# Patient Record
Sex: MALE | Race: WHITE | Employment: OTHER | URBAN - NONMETROPOLITAN AREA
[De-identification: names, ages, dates, MRNs, and addresses within clinical notes are randomized per-mention and may not be internally consistent; named-entity substitution may affect disease eponyms.]

---

## 2021-01-08 ENCOUNTER — HOSPITAL ENCOUNTER (OUTPATIENT)
Dept: PHYSICAL THERAPY | Age: 71
Discharge: HOME | End: 2021-01-08
Payer: MEDICARE

## 2021-01-08 PROCEDURE — 97161 PT EVAL LOW COMPLEX 20 MIN: CPT

## 2021-01-08 NOTE — PROGRESS NOTES
[address]  [place], [place]  Ph: [phone]    Fax: [phone]    Plan of Care/Statement of Necessity for Physical Therapy Services  2-15    Patient name: Lorenzo Tidwell  : 1950  Provider#: [id number]  Referral source: Rah Ashraf, *      Medical/Treatment Diagnosis: Fracture of left ankle [S82.395Y]     Prior Hospitalization: see medical history     Comorbidities: see medical history  Prior Level of Function: community ambulator  Medications: Verified on Patient Summary List  Start of Care: 2021      Onset Date: 21   The Plan of Care and following information is based on the information from the initial evaluation. Assessment/ key information: Pt is a pleasant 79 y.o. male presenting to physical therapy s/p ORIF of L ankle on 21. Pt demonstrates increased pain and swelling, decreased L ankle ROM, decreased LE strength, decreased weight bearing status, decreased gait quality and endurance, as well as decreased functional mobility. Pt would benefit from skilled physical therapy to improve pain and swelling with the use of therapeutic modalities, improve ankle ROM, LE strength, and weight bearing tolerance with therapeutic exercises, improve gait quality and endurance with gait training, and improve functional mobility.      Evaluation Complexity History MEDIUM  Complexity : 1-2 comorbidities / personal factors will impact the outcome/ POC ; Examination MEDIUM Complexity : 3 Standardized tests and measures addressing body structure, function, activity limitation and / or participation in recreation  ;Presentation LOW Complexity : Stable, uncomplicated  ;Clinical Decision Making Unable to perform TUG due to weightbearing status  Overall Complexity Rating: LOW     Problem List: pain affecting function, decrease ROM, decrease strength, edema affecting function, impaired gait/ balance, decrease ADL/ functional abilitiies, decrease activity tolerance and decrease transfer abilities   Treatment Plan may include any combination of the following: Therapeutic exercise, Therapeutic activities, Neuromuscular re-education, Physical agent/modality, Gait/balance training, Manual therapy, Patient education, Functional mobility training and Stair training  Patient / Family readiness to learn indicated by: trying to perform skills  Persons(s) to be included in education: patient (P)  Barriers to Learning/Limitations: None  Patient Goal (s): to return to PLOF  Patient Self Reported Health Status: good  Rehabilitation Potential: good    Short Term Goals: To be accomplished in 8 treatments:  1. Pt will be able to demonstrate proper use of FWW with proper weight bearing status to facilitate functional mobility with weight bearing precautions. 2. Pt will demonstrate proper donning/doffing of boot to facilitate proper protection of ORIF. 3. Pt will be able to increase DF/PF L AROM to match R to facilitate ankle mobility for improved gait quality. Long Term Goals: To be accomplished in 16 treatments:  1. Pt will be independent and compliant with HEP in order to facilitate functional mobility and carryover from PT services. 2. Pt will be able to increase DF/PF strength on L to 4/5 to facilitate performance of ADLs. 3. Pt will be able to ambulate 500' with LRAD with proper weight bearing status to facilitate household ambulation. Frequency / Duration: Patient to be seen 2 times per week for 8 weeks. Patient/ Caregiver education and instruction: activity modification and brace/ splint application    [x]  Plan of care has been reviewed with PTA        Certification Period: 1/8/21 - 3/5/21    Sarah Villalta PT, DPT 1/8/2021     ________________________________________________________________________    I certify that the above Therapy Services are being furnished while the patient is under my care.  I agree with the treatment plan and certify that this therapy is necessary.     Physician's Signature:____________________  Date:____________Time: _________    Patient name: Vicky Sprague  : 1950  Provider#: [id number]

## 2021-01-08 NOTE — PROGRESS NOTES
PT INITIAL EVALUATION NOTE - [facility] 2-15    Patient Name: Lauren Sports  Date:2021  : 1950  [x]  Patient  Verified  Payor: Priya Grimes / Plan: [place] & [place] / Product Type: Medicare /    In time:10:28  Out time:11:19  Total Treatment Time (min): 51  Total Timed Codes (min): 51  1:1 Treatment Time ( [address] only): 46   Visit #: 1    Treatment Area: Fracture of left ankle [S82.892A]    SUBJECTIVE  Pain Level (0-10 scale): 6/10; worst: 10/10 (throbbing, sharp p!)  Any medication changes, allergies to medications, adverse drug reactions, diagnosis change, or new procedure performed?: [] No    [x] Yes (see summary sheet for update)  Subjective: Pt reports that he was trying to turn around on a ladder, 4-5 steps off of the ground, when he fell of and broke his L ankle. Pt received ORIF surgery on 21. Pt reports he has been using axillary crutches that he borrowed from a family member. Pt brought in a boot that he had from a previous injury to utilize. Pt reports that the doctor told him \"not to put much weight\" on his L foot. PLOF: community ambulator; perform ADLs. Mechanism of Injury: fall off of ladder  Previous Treatment/Compliance: yes - for other medical reasons  Radiographs: x-ray   What increases symptoms: trying to stand up, weight on the foot  What decreases symptoms: pain medication  PMHx/Surgical Hx: diabetes, pacemaker  Work Hx: retired  Living Situation: one story home; 1 step to enter from side of house with no railing and 3 steps of enter from front of house B railings. Pt Goals: \"to return to PLOF\"  Barriers: poor previous PT experiences. Motivation: good  Substance use: none noted  Cognition: A & O x 4        OBJECTIVE/EXAMINATION  Other Observations: Noticeable bruising, erythema, and warmth to anterior shin proximal to surgical site as well as swelling around L foot and ankle consistent with post-operative status.   Gait and Functional Mobility: Pt ambulates into clinic with use of axillary crutches with reduced weight bearing on L. Palpation: TTP over L foot and ankle    Hip:  Strength AROM PROM     Right Left Right Left Right Left    Flexion 4/5 4/5 WFL WFL NT NT   Knee:  Strength AROM PROM     Right Left Right Left Right Left    Flexion 5/5 5/5 WFL WFL NT NT    Extension 5/5 5/5 WFL WFL NT NT   Ankle  Strength AROM PROM     Right Left Right Left Right Left    Dorsiflexion 5/5 2/5 19 WFL NT NT    Platarflexion 5/5 2/5 18 WFL NT NT   *All strength measures are on a scale with 5 as a maximum, if a space is left blank it was not tested. All ROM measurements are measured in degrees. With   [x] TE   [] TA   [] neuro   [] other: Patient Education: [] Review HEP    [] Progressed/Changed HEP based on:   [] positioning   [] body mechanics   [] transfers   [x] heat/ice application    [x] other: Pt educated on wrapping of ankle as well as donning/doffing of boot. Pt educated on proper use of FWW for maintaining proper weight bearing status. Pt recommended to perform ankle pumps to aid in reduction of post-operative swelling. Other Objective/Functional Measures: Unable to perform TUG due to weightbearing status.     Pain Level (0-10 scale) post treatment: 6/10    ASSESSMENT/Changes in Function:     [x]  See Plan of Samanta Palomo PT, DPT 1/8/2021

## 2021-01-12 ENCOUNTER — APPOINTMENT (OUTPATIENT)
Dept: PHYSICAL THERAPY | Age: 71
End: 2021-01-12
Payer: MEDICARE

## 2021-01-12 NOTE — PROGRESS NOTES
[address]  [place], [place]  Ph: [phone]     Fax: [phone]    Discharge Summary 2-15    Patient name: Marlen Vera  : 1950  Provider#: [id number]  Referral source: Yasemin [place], *      Medical/Treatment Diagnosis: Fracture of left ankle [S82.892A]     Prior Hospitalization: see medical history     Comorbidities: See Plan of Care  Prior Level of Function: See Plan of Care  Medications: Verified on Patient Summary List    Start of Care: 21      Onset Date:21   Visits from Start of Care: 1   Missed Visits: 0  Reporting Period : 21 to 2021    Assessment / Summary of care: Pt is a pleasant 79 y.o. male referred to physical therapy s/p ORIF of L ankle on 21. After discussion with MD office, pt is not appropriate at this time for physical therapy due to acute nature of surgery. PT discussed with pt the need to discontinue physical therapy at this time following discussion with MD office. RECOMMENDATIONS:  [x]Discontinue therapy: []Patient has reached or is progressing toward set goals     []Patient is non-compliant or has abdicated     []Due to lack of appreciable progress towards set goals     [x]Other: Pt is inappropriate for physical therapy at this time.     Rah Rico, PT, DPT 2021

## 2021-01-14 ENCOUNTER — APPOINTMENT (OUTPATIENT)
Dept: PHYSICAL THERAPY | Age: 71
End: 2021-01-14
Payer: MEDICARE

## 2021-01-19 ENCOUNTER — APPOINTMENT (OUTPATIENT)
Dept: PHYSICAL THERAPY | Age: 71
End: 2021-01-19
Payer: MEDICARE

## 2021-01-21 ENCOUNTER — APPOINTMENT (OUTPATIENT)
Dept: PHYSICAL THERAPY | Age: 71
End: 2021-01-21
Payer: MEDICARE

## 2022-05-26 ENCOUNTER — TRANSCRIBE ORDER (OUTPATIENT)
Dept: SCHEDULING | Age: 72
End: 2022-05-26

## 2022-05-26 DIAGNOSIS — G89.29 CHRONIC PAIN OF LEFT ANKLE: Primary | ICD-10-CM

## 2022-05-26 DIAGNOSIS — M25.572 CHRONIC PAIN OF LEFT ANKLE: Primary | ICD-10-CM

## 2022-06-01 ENCOUNTER — HOSPITAL ENCOUNTER (OUTPATIENT)
Dept: CT IMAGING | Age: 72
Discharge: HOME | End: 2022-06-01
Attending: ORTHOPAEDIC SURGERY
Payer: MEDICARE

## 2022-06-01 DIAGNOSIS — G89.29 CHRONIC PAIN OF LEFT ANKLE: ICD-10-CM

## 2022-06-01 DIAGNOSIS — M25.572 CHRONIC PAIN OF LEFT ANKLE: ICD-10-CM

## 2022-06-01 PROCEDURE — 73700 CT LOWER EXTREMITY W/O DYE: CPT

## 2023-05-09 ENCOUNTER — HOSPITAL ENCOUNTER (OUTPATIENT)
Facility: HOSPITAL | Age: 73
Setting detail: RECURRING SERIES
Discharge: HOME | End: 2023-05-12
Payer: MEDICARE

## 2023-05-09 PROCEDURE — 97161 PT EVAL LOW COMPLEX 20 MIN: CPT

## 2023-05-09 NOTE — THERAPY EVALUATION
[address]  [place], [place]  Ph: [phone]     Fax: [phone]         PHYSICAL THERAPY - MEDICARE EVALUATION/PLAN OF CARE NOTE (updated 3/23)      Date: 2023          Patient Name:  Megan Inman :  1950   Medical   Diagnosis:  Localized swelling, mass and lump, lower limb, bilateral [R22.43]  Foot drop, left foot [M21.372]  Low back pain, unspecified [M54.50] Treatment Diagnosis:  M54.41  LUMBAGO WITH SCIATICA, RIGHT SIDE    Referral Source:  Sameera De Jesus MD Provider #:  [id number]                Insurance: Payor: MEDICARE / Plan: MEDICARE PART A AND B / Product Type: *No Product type* /    [x] Patient's date of birth verified      Visit #   Current  / Total 1 12   Time   In / Out 3:25 PM 4:00 PM   Total Treatment Time 35 minutes   Total Timed Codes 0 minutes   1:1 Treatment Time 0 minutes      [facility] Totals Reminder:  bill using total billable   min of TIMED therapeutic procedures and modalities. 8-22 min = 1 unit; 23-37 min = 2 units; 38-52 min = 3 units;  53-67 min = 4 units; 68-82 min = 5 units       SUBJECTIVE  Pain Level (0-10 scale): 10/10  Changes in pain since onset: Constant    Any medication changes, allergies to medications, adverse drug reactions, diagnosis change, or new procedure performed?: [x] No    [] Yes (see summary sheet for update)  Medications: Verified on Patient Summary List    Subjective functional status/changes:     Patient is 67 y.o.  Male  who presents for physical therapy evaluation with increasing low back pain with radiculopathy of right LE. Patient is experiencing increasing lumbar pain with any movement and apprehensive regarding treatments plan. MD recommended Physical Therapy for acute LB pain and  a MRI on May 25, 2023. Additionally, patient has surgery for  left ankle which required fusion 6 months ago. Dianne Collins  He now has left drop foot and requires a cane and walking boot with

## 2023-05-16 ENCOUNTER — APPOINTMENT (OUTPATIENT)
Facility: HOSPITAL | Age: 73
End: 2023-05-16
Payer: MEDICARE

## 2023-05-17 ENCOUNTER — HOSPITAL ENCOUNTER (OUTPATIENT)
Facility: HOSPITAL | Age: 73
Setting detail: RECURRING SERIES
Discharge: HOME | End: 2023-05-20
Payer: MEDICARE

## 2023-05-17 PROCEDURE — 97110 THERAPEUTIC EXERCISES: CPT

## 2023-05-17 RX ORDER — INSULIN ASPART 100 [IU]/ML
INJECTION, SOLUTION INTRAVENOUS; SUBCUTANEOUS
COMMUNITY

## 2023-05-17 NOTE — PROGRESS NOTES
PHYSICAL THERAPY - MEDICARE DAILY TREATMENT NOTE (updated 3/23)    Date of Service: 2023        Patient Name:  Marlen Steen :  1950   Medical   Diagnosis:  Localized swelling, mass and lump, lower limb, bilateral [R22.43]  Foot drop, left foot [M21.372]  Low back pain, unspecified [M54.50] Treatment Diagnosis:  M54.41  LUMBAGO WITH SCIATICA, RIGHT SIDE    Referral Source:  Macie Parker MD Insurance:   Payor: Ravin Bachelor / Plan: MEDICARE PART A AND B / Product Type: *No Product type* /    [x] Patient's date of birth verified                      Visit #   Current  / Total 2 12   Time   In / Out 10:30 AM 10:55 AM   Total Treatment Time 25 minutes   Total Timed Codes 25 minutes   1:1 Treatment Time 25 minutes      [facility] Totals Reminder:  bill using total billable   min of TIMED therapeutic procedures and modalities. 8-22 min = 1 unit; 23-37 min = 2 units; 38-52 min = 3 units; 53-67 min = 4 units; 68-82 min = 5 units        SUBJECTIVE  Pain Level (0-10 scale): 6/10    Any medication changes, allergies to medications, adverse drug reactions, diagnosis change, or new procedure performed?: [x] No    [] Yes (see summary sheet for update)  Medications: [x] Verified on Patient Summary List    Subjective functional status/changes:     \"Patient  stated that he  be getting a heart catherization on  tomorrow, May 18, 2023, in [place]. \" I did the stress test last week and found a medium blockage. However, they have  been trying me on a new medication which is causing me to be nauseated. OBJECTIVE  Therapeutic Procedures: Tx Min Billable or 1:1 Min (if diff from Tx Min) Procedure, Rationale, Specifics   25 25 [id number] Therapeutic Exercise (timed):  increase ROM, strength, coordination, balance, and proprioception to improve patient's ability to progress to PLOF and address remaining functional goals.  (see flow sheet as applicable)                   25 25    Total Total       Pain Level at end of session

## 2023-05-19 ENCOUNTER — APPOINTMENT (OUTPATIENT)
Facility: HOSPITAL | Age: 73
End: 2023-05-19
Payer: MEDICARE

## 2023-05-22 ENCOUNTER — HOSPITAL ENCOUNTER (OUTPATIENT)
Facility: HOSPITAL | Age: 73
Setting detail: RECURRING SERIES
Discharge: HOME | End: 2023-05-25
Payer: MEDICARE

## 2023-05-22 NOTE — PROGRESS NOTES
Communication regarding medical clearance following heart catheterization performed on May 18, 2023 at [facility]. Therapist contacted Dr. Chanel Ralph office and left a telephone voice message with his nurse, Rimma Doshi, for medical clearance to continue Physical Therapy following heart procedure. Telephone number is for MD office is  [phone]. Requested a follow up return phone call. No  Physical Therapy treatment provided today. Next Physical Therapy appointment is scheduled for 5/23/23.

## 2023-05-23 ENCOUNTER — HOSPITAL ENCOUNTER (OUTPATIENT)
Facility: HOSPITAL | Age: 73
Setting detail: RECURRING SERIES
End: 2023-05-23
Payer: MEDICARE

## 2023-06-01 ENCOUNTER — HOSPITAL ENCOUNTER (OUTPATIENT)
Facility: HOSPITAL | Age: 73
Setting detail: RECURRING SERIES
Discharge: HOME | End: 2023-06-04
Payer: MEDICARE

## 2023-06-01 PROCEDURE — 97110 THERAPEUTIC EXERCISES: CPT

## 2023-06-07 ENCOUNTER — HOSPITAL ENCOUNTER (OUTPATIENT)
Facility: HOSPITAL | Age: 73
Setting detail: RECURRING SERIES
Discharge: HOME | End: 2023-06-10
Payer: MEDICARE

## 2023-06-07 PROCEDURE — 97150 GROUP THERAPEUTIC PROCEDURES: CPT

## 2023-06-07 NOTE — PROGRESS NOTES
remaining functional goals. min [] Estim Unattended,             []  NMES  [] PreMod       []  IFC  [] Other:  []w/US   []w/ice   []w/heat  Position:  Location:            min []  Mechanical Traction,        []  Cervical      []  Lumbar        []  Prone         []  Supine           []  Intermitt. []  Cont. Lbs:    pounds  [] With heat  [] Simultaneously performed with E-Stim    min []  Ultrasound,    []Continuous   [] Pulsed  []1MHz   []3MHz Location:  W/cm2:   10 min  Unbilled []  Ice     [x]  Heat             Position: seated   Location: low back            min []  Vasopneumatic Device,      pre-treatment girth :  cm   post-treatment girth : cm   measured at (landmark       location) :  Pressure:       [] lo [] med [] hi   Temperature: [] lo [] med [] Hi    [] With ice    [] Simultaneously performed with Estim     Skin assessment post-treatment (if applicable):    [x]  intact    [x]  redness- no adverse reaction []redness - adverse reaction:        [x] Patient Education billed concurrently with other procedures   [x] Review HEP    [] Progressed/Changed HEP, detail:    [] Other:         Pain Level at end of session (0-10 scale): 3/10    Assessment   Patient tolerated treatment worked on stretching bilat and with increased attention to areas that seem to address R low back increased c/o. Pt does have tightness in both R and L legs at different areas. MHP used post ex to ease lingering soreness. Patient will continue to benefit from skilled PT services to modify and progress therapeutic interventions, analyze and address functional mobility deficits, analyze and address ROM deficits, analyze and address strength deficits, and analyze and address soft tissue restrictions to address functional deficits and attain remaining goals. Progress toward goals / Updated goals:     SHORT TERM GOALS, to be met within 6 treatments:  1.  Patient will be able to perform lumbar stretching exercises independently as

## 2024-07-24 ENCOUNTER — HOSPITAL ENCOUNTER (OUTPATIENT)
Facility: HOSPITAL | Age: 74
Setting detail: RECURRING SERIES
Discharge: HOME | End: 2024-07-27
Payer: MEDICARE

## 2024-07-24 PROCEDURE — 97110 THERAPEUTIC EXERCISES: CPT

## 2024-07-24 PROCEDURE — 97161 PT EVAL LOW COMPLEX 20 MIN: CPT

## 2024-07-24 NOTE — THERAPY EVALUATION
this task  Current Status: see above, Initial Evaluation completed today  Goal Met?  No    3. Patient will be able to get out of bed with less then 3/10 pain and less difficulty with improve lumbar flexion to >80 deg.   Status at last Eval/Progress Note: 70 deg  Current Status: see above, Initial Evaluation completed today  Goal Met?  No      Frequency / Duration: Patient to be seen 2 times per week for 10 treatments    Patient/ Caregiver education and instruction: Diagnosis, prognosis, exercises   [x]  Plan of care has been reviewed with PTA    Certification period: 2024 to 10/22/2024        Abi Carr PT, DPT       2024       11:08 AM        ===================================================================  I certify that the above Therapy Services are being furnished while the patient is under my care. I agree with the treatment plan and certify that this therapy is necessary.    Physician's Signature:_________________________   DATE:_________   TIME:________                           Jeimy Swartz MD    ** Signature, Date and Time must be completed for valid certification **  Please sign and fax to [phone].  Thank you    Patient Name:  Byron Hyde :  1950       - - -

## 2024-07-29 ENCOUNTER — HOSPITAL ENCOUNTER (OUTPATIENT)
Facility: HOSPITAL | Age: 74
Setting detail: RECURRING SERIES
Discharge: HOME | End: 2024-08-01
Payer: MEDICARE

## 2024-07-29 PROCEDURE — 97110 THERAPEUTIC EXERCISES: CPT

## 2024-07-29 NOTE — PROGRESS NOTES
PHYSICAL THERAPY - MEDICARE DAILY TREATMENT NOTE (updated 3/23)    Date of Service: 2024        Patient Name:  Byron Hyde :  1950   Medical   Diagnosis:  S/P lumbar spinal fusion [Z98.1] Treatment Diagnosis:  M54.59  OTHER LOWER BACK PAIN    Referral Source:  Jeimy Swartz MD Insurance:   Payor: MEDICARE / Plan: MEDICARE PART A AND B / Product Type: *No Product type* /    [x] Patient's date of birth verified                      Visit #   Current  / Total 2 10   Time   In / Out 1100 1154   Total Treatment Time (in minutes) 54    Total Timed Codes (in minutes) 44    1:1 Treatment Time (in minutes) 44       [facility] Totals Reminder:  bill using total billable   min of TIMED therapeutic procedures and modalities.   8-22 min = 1 unit; 23-37 min = 2 units; 38-52 min = 3 units; 53-67 min = 4 units; 68-82 min = 5 units        SUBJECTIVE  Pain Level (0-10 scale): 6/10    Any medication changes, allergies to medications, adverse drug reactions, diagnosis change, or new procedure performed?: No  Medications: [x] Verified on Patient Summary List    Subjective functional status/changes:     \"I hurt in my lower back after last time.\"    OBJECTIVE  Therapeutic Procedures:  Tx Min Billable or 1:1 Min (if diff from Tx Min) Procedure, Rationale, Specifics   44 44 49667 Therapeutic Exercise (timed):  increase ROM, strength, coordination, balance, and proprioception to improve patient's ability to progress to PLOF and address remaining functional goals. (see flow sheet as applicable)   44 44    Total Total   [x] Patient Education billed concurrently with other procedures     Modalities Rationale:     decrease pain and increase tissue extensibility to improve patient's ability to progress to PLOF and address remaining functional goals.       min [] Estim Unattended,             []  NMES  [] PreMod       []  IFC  [] Other:  []w/US   []w/ice   []w/heat  Position:  Location:            min []  Mechanical Traction,

## 2024-07-31 ENCOUNTER — HOSPITAL ENCOUNTER (OUTPATIENT)
Facility: HOSPITAL | Age: 74
Setting detail: RECURRING SERIES
Discharge: HOME | End: 2024-08-03
Payer: MEDICARE

## 2024-07-31 PROCEDURE — 97110 THERAPEUTIC EXERCISES: CPT

## 2024-07-31 NOTE — PROGRESS NOTES
PHYSICAL THERAPY - MEDICARE DAILY TREATMENT NOTE (updated 3/23)    Date of Service: 2024        Patient Name:  Byron Hyde :  1950   Medical   Diagnosis:  S/P lumbar spinal fusion [Z98.1] Treatment Diagnosis:  M54.59  OTHER LOWER BACK PAIN    Referral Source:  Jeimy Swartz MD Insurance:   Payor: MEDICARE / Plan: MEDICARE PART A AND B / Product Type: *No Product type* /    [x] Patient's date of birth verified                      Visit #   Current  / Total 3 10   Time   In / Out 1058 1158   Total Treatment Time (in minutes) 60    Total Timed Codes (in minutes) 50    1:1 Treatment Time (in minutes) 50       [facility] Totals Reminder:  bill using total billable   min of TIMED therapeutic procedures and modalities.   8-22 min = 1 unit; 23-37 min = 2 units; 38-52 min = 3 units; 53-67 min = 4 units; 68-82 min = 5 units        SUBJECTIVE  Pain Level (0-10 scale): 3-4/10    Any medication changes, allergies to medications, adverse drug reactions, diagnosis change, or new procedure performed?: No  Medications: [x] Verified on Patient Summary List    Subjective functional status/changes:     \"As long as I am walking it feel ok, but when I stop and stand for a little is becomes a lot worse and I need to lean.\"    OBJECTIVE  Therapeutic Procedures:  Tx Min Billable or 1:1 Min (if diff from Tx Min) Procedure, Rationale, Specifics   50  [id number] Therapeutic Exercise (timed):  increase ROM, strength, coordination, balance, and proprioception to improve patient's ability to progress to PLOF and address remaining functional goals. (see flow sheet as applicable)   50     Total Total   [x] Patient Education billed concurrently with other procedures     Modalities Rationale:     decrease pain and increase tissue extensibility to improve patient's ability to progress to PLOF and address remaining functional goals.       min [] Estim Unattended,             []  NMES  [] PreMod       []  IFC  [] Other:  []w/US   []w/ice

## 2024-08-05 ENCOUNTER — HOSPITAL ENCOUNTER (OUTPATIENT)
Facility: HOSPITAL | Age: 74
Setting detail: RECURRING SERIES
Discharge: HOME | End: 2024-08-08
Payer: MEDICARE

## 2024-08-05 PROCEDURE — 97110 THERAPEUTIC EXERCISES: CPT

## 2024-08-05 PROCEDURE — 97150 GROUP THERAPEUTIC PROCEDURES: CPT

## 2024-08-05 NOTE — PROGRESS NOTES
PHYSICAL THERAPY - MEDICARE DAILY TREATMENT NOTE (updated 3/23)    Date of Service: 2024        Patient Name:  Byron Hyde :  1950   Medical   Diagnosis:  S/P lumbar spinal fusion [Z98.1] Treatment Diagnosis:  M54.59  OTHER LOWER BACK PAIN    Referral Source:  Jeimy Swartz MD Insurance:   Payor: MEDICARE / Plan: MEDICARE PART A AND B / Product Type: *No Product type* /    [x] Patient's date of birth verified                      Visit #   Current  / Total 4 10   Time   In / Out 0837 0934   Total Treatment Time (in minutes) 57    Total Timed Codes (in minutes) 23    1:1 Treatment Time (in minutes) 23       [facility] Totals Reminder:  bill using total billable   min of TIMED therapeutic procedures and modalities.   8-22 min = 1 unit; 23-37 min = 2 units; 38-52 min = 3 units; 53-67 min = 4 units; 68-82 min = 5 units        SUBJECTIVE  Pain Level (0-10 scale): 4/10    Any medication changes, allergies to medications, adverse drug reactions, diagnosis change, or new procedure performed?: No  Medications: [x] Verified on Patient Summary List    Subjective functional status/changes:     \"I am just slow this morning.\"    OBJECTIVE  Therapeutic Procedures:  Tx Min Billable or 1:1 Min (if diff from Tx Min) Procedure, Rationale, Specifics   23 23 12989 Therapeutic Exercise (timed):  increase ROM, strength, coordination, balance, and proprioception to improve patient's ability to progress to PLOF and address remaining functional goals. (see flow sheet as applicable)   24 0 56912 Group Therapy (untimed): Billed while completing therapeutic exercise during end of treatment session to improve patient's ability to progress to PLOF and address remaining functional goals.  (see flow sheet as applicable)   47 23    Total Total   [x] Patient Education billed concurrently with other procedures     Modalities Rationale:     decrease pain and increase tissue extensibility to improve patient's ability to progress to 
None

## 2024-08-07 ENCOUNTER — HOSPITAL ENCOUNTER (OUTPATIENT)
Facility: HOSPITAL | Age: 74
Setting detail: RECURRING SERIES
Discharge: HOME | End: 2024-08-10
Payer: MEDICARE

## 2024-08-07 PROCEDURE — 97110 THERAPEUTIC EXERCISES: CPT

## 2024-08-07 PROCEDURE — 97150 GROUP THERAPEUTIC PROCEDURES: CPT

## 2024-08-07 NOTE — PROGRESS NOTES
Met?  No     3. Patient will have 5/5 LE strength to be able to climb stairs without issues to carry groceries into home.   Status at last Eval/Progress Note: 4+/5  Current Status: 4+/5  Goal Met?  No        Long Term Goals, to be met within 10 treatments:  1.Patient will be able to walk for >20 minutes with less then 3/10 pain to perform yard work tasks.   Status at last Eval/Progress Note: pain within 5 minutes and not doing yard work  Current Status: 5 min tolerance  Goal Met?  No     2.  Patient will be able to squat to pick objects off the floor without limitations for house and yard work.  Status at last Eval/Progress Note: difficulty with this task  Current Status: prefers to bend to  objects  Goal Met?  No     3. Patient will be able to get out of bed with less then 3/10 pain and less difficulty with improve lumbar flexion to >80 deg.   Status at last Eval/Progress Note: 70 deg  Current Status: 70 deg and more pain when first moving in AM  Goal Met?  No    []  See Progress Note/Recertification  []  See Discharge Summary    PLAN  [x]  Continue plan of care  [x]  Upgrade activities as tolerated  []  Discharge due to :  []  Other:      Abi Carr, PT, DPT       8/7/2024       11:14 AM

## 2024-08-12 ENCOUNTER — HOSPITAL ENCOUNTER (OUTPATIENT)
Facility: HOSPITAL | Age: 74
Setting detail: RECURRING SERIES
Discharge: HOME | End: 2024-08-15
Payer: MEDICARE

## 2024-08-12 PROCEDURE — 97110 THERAPEUTIC EXERCISES: CPT

## 2024-08-12 NOTE — PROGRESS NOTES
PHYSICAL THERAPY - MEDICARE DAILY TREATMENT NOTE (updated 3/23)    Date of Service: 2024        Patient Name:  Byron Hyde :  1950   Medical   Diagnosis:  S/P lumbar spinal fusion [Z98.1] Treatment Diagnosis:  M54.59  OTHER LOWER BACK PAIN    Referral Source:  Jeimy Swartz MD Insurance:   Payor: MEDICARE / Plan: MEDICARE PART A AND B / Product Type: *No Product type* /    [x] Patient's date of birth verified                      Visit #   Current  / Total 6 10   Time   In / Out 1105 1200   Total Treatment Time (in minutes) 55    Total Timed Codes (in minutes) 45    1:1 Treatment Time (in minutes) 45       [facility] Totals Reminder:  bill using total billable   min of TIMED therapeutic procedures and modalities.   8-22 min = 1 unit; 23-37 min = 2 units; 38-52 min = 3 units; 53-67 min = 4 units; 68-82 min = 5 units        SUBJECTIVE  Pain Level (0-10 scale): 2/10    Any medication changes, allergies to medications, adverse drug reactions, diagnosis change, or new procedure performed?: No  Medications: [x] Verified on Patient Summary List    Subjective functional status/changes:     \"I got a good night sleep last night which helped.\"    OBJECTIVE  Therapeutic Procedures:  Tx Min Billable or 1:1 Min (if diff from Tx Min) Procedure, Rationale, Specifics   45  50777 Therapeutic Exercise (timed):  increase ROM, strength, coordination, balance, and proprioception to improve patient's ability to progress to PLOF and address remaining functional goals. (see flow sheet as applicable)   45     Total Total   [x] Patient Education billed concurrently with other procedures     Modalities Rationale:     decrease pain and increase tissue extensibility to improve patient's ability to progress to PLOF and address remaining functional goals.       min [] Estim Unattended,             []  NMES  [] PreMod       []  IFC  [] Other:  []w/US   []w/ice   []w/heat  Position:  Location:            min []  Mechanical

## 2024-08-14 ENCOUNTER — HOSPITAL ENCOUNTER (OUTPATIENT)
Facility: HOSPITAL | Age: 74
Setting detail: RECURRING SERIES
Discharge: HOME | End: 2024-08-17
Payer: MEDICARE

## 2024-08-14 PROCEDURE — 97110 THERAPEUTIC EXERCISES: CPT

## 2024-08-14 NOTE — PROGRESS NOTES
PHYSICAL THERAPY - MEDICARE DAILY TREATMENT NOTE (updated 3/23)    Date of Service: 2024        Patient Name:  Byron Hyde :  1950   Medical   Diagnosis:  S/P lumbar spinal fusion [Z98.1] Treatment Diagnosis:  M54.59  OTHER LOWER BACK PAIN    Referral Source:  Jeimy Swartz MD Insurance:   Payor: MEDICARE / Plan: MEDICARE PART A AND B / Product Type: *No Product type* /    [x] Patient's date of birth verified                      Visit #   Current  / Total 7 10   Time   In / Out 1102 1159   Total Treatment Time (in minutes) 57    Total Timed Codes (in minutes) 47    1:1 Treatment Time (in minutes) 47       [facility] Totals Reminder:  bill using total billable   min of TIMED therapeutic procedures and modalities.   8-22 min = 1 unit; 23-37 min = 2 units; 38-52 min = 3 units; 53-67 min = 4 units; 68-82 min = 5 units        SUBJECTIVE  Pain Level (0-10 scale): 4/10    Any medication changes, allergies to medications, adverse drug reactions, diagnosis change, or new procedure performed?: No  Medications: [x] Verified on Patient Summary List    Subjective functional status/changes:     \"My ankle is hurting today which has flared my back up today.\"    OBJECTIVE  Therapeutic Procedures:  Tx Min Billable or 1:1 Min (if diff from Tx Min) Procedure, Rationale, Specifics   47  24076 Therapeutic Exercise (timed):  increase ROM, strength, coordination, balance, and proprioception to improve patient's ability to progress to PLOF and address remaining functional goals. (see flow sheet as applicable)   47     Total Total   [x] Patient Education billed concurrently with other procedures     Modalities Rationale:     decrease pain and increase tissue extensibility to improve patient's ability to progress to PLOF and address remaining functional goals.       min [] Estim Unattended,             []  NMES  [] PreMod       []  IFC  [] Other:  []w/US   []w/ice   []w/heat  Position:  Location:            min []

## 2024-08-19 ENCOUNTER — HOSPITAL ENCOUNTER (OUTPATIENT)
Facility: HOSPITAL | Age: 74
Setting detail: RECURRING SERIES
Discharge: HOME | End: 2024-08-22
Payer: MEDICARE

## 2024-08-19 PROCEDURE — 97110 THERAPEUTIC EXERCISES: CPT

## 2024-08-19 NOTE — PROGRESS NOTES
PHYSICAL THERAPY - MEDICARE DAILY TREATMENT NOTE (updated 3/23)    Date of Service: 2024        Patient Name:  Byron Hyde :  1950   Medical   Diagnosis:  S/P lumbar spinal fusion [Z98.1] Treatment Diagnosis:  M54.59  OTHER LOWER BACK PAIN    Referral Source:  Jeimy Swartz MD Insurance:   Payor: MEDICARE / Plan: MEDICARE PART A AND B / Product Type: *No Product type* /    [x] Patient's date of birth verified                      Visit #   Current  / Total 8 10   Time   In / Out 1032 1132   Total Treatment Time (in minutes) 60    Total Timed Codes (in minutes) 50    1:1 Treatment Time (in minutes) 50       [facility] Totals Reminder:  bill using total billable   min of TIMED therapeutic procedures and modalities.   8-22 min = 1 unit; 23-37 min = 2 units; 38-52 min = 3 units; 53-67 min = 4 units; 68-82 min = 5 units        SUBJECTIVE  Pain Level (0-10 scale): 6/10    Any medication changes, allergies to medications, adverse drug reactions, diagnosis change, or new procedure performed?: No  Medications: [x] Verified on Patient Summary List    Subjective functional status/changes:     \"My ankle and back are hurting today.\"    OBJECTIVE  Therapeutic Procedures:  Tx Min Billable or 1:1 Min (if diff from Tx Min) Procedure, Rationale, Specifics   50  53338 Therapeutic Exercise (timed):  increase ROM, strength, coordination, balance, and proprioception to improve patient's ability to progress to PLOF and address remaining functional goals. (see flow sheet as applicable)   50     Total Total   [x] Patient Education billed concurrently with other procedures     Modalities Rationale:     decrease pain and increase tissue extensibility to improve patient's ability to progress to PLOF and address remaining functional goals.       min [] Estim Unattended,             []  NMES  [] PreMod       []  IFC  [] Other:  []w/US   []w/ice   []w/heat  Position:  Location:            min []  Mechanical Traction,        []

## 2024-08-21 ENCOUNTER — HOSPITAL ENCOUNTER (OUTPATIENT)
Facility: HOSPITAL | Age: 74
Setting detail: RECURRING SERIES
Discharge: HOME | End: 2024-08-24
Payer: MEDICARE

## 2024-08-21 PROCEDURE — 97150 GROUP THERAPEUTIC PROCEDURES: CPT

## 2024-08-21 PROCEDURE — 97110 THERAPEUTIC EXERCISES: CPT

## 2024-08-21 NOTE — PROGRESS NOTES
PHYSICAL THERAPY - MEDICARE DAILY TREATMENT NOTE (updated 3/23)    Date of Service: 2024        Patient Name:  Byron Hyde :  1950   Medical   Diagnosis:  S/P lumbar spinal fusion [Z98.1] Treatment Diagnosis:  M54.59  OTHER LOWER BACK PAIN    Referral Source:  Jeimy Swartz MD Insurance:   Payor: MEDICARE / Plan: MEDICARE PART A AND B / Product Type: *No Product type* /    [x] Patient's date of birth verified                      Visit #   Current  / Total 9 10   Time   In / Out 1042 1132   Total Treatment Time (in minutes) 50    Total Timed Codes (in minutes) 15   1:1 Treatment Time (in minutes)  15      [facility] Totals Reminder:  bill using total billable   min of TIMED therapeutic procedures and modalities.   8-22 min = 1 unit; 23-37 min = 2 units; 38-52 min = 3 units; 53-67 min = 4 units; 68-82 min = 5 units        SUBJECTIVE  Pain Level (0-10 scale): 4/10    Any medication changes, allergies to medications, adverse drug reactions, diagnosis change, or new procedure performed?: No  Medications: [x] Verified on Patient Summary List    Subjective functional status/changes:     \"My leg is hurting. I am not sure how much better it is going to get.\"    OBJECTIVE    Posture:  slight flexed posture at the hips and wide base of support  Gait and Functional Mobility:  increased time needed to stand upright; increased flexion as ambulation continued  Palpation: no tenderness                                                         Lumbar AROM:                                                      Flexion                                                  75 degrees                                                     Extension                                              40 degrees                                                                                                                R                                   L  Side Bending                               30 degrees                     25 
from PT services.  Status at last Eval/Progress Note: provided  Current Status: attempting to perform occasionally; updated to more seated/standing   Goal Met?  In Progress     2.  Patient will be able to stand for >15 minutes without fatigue and pain >4/10 in lumbar spine.   Status at last Eval/Progress Note: 5 minutes before higher pain  Current Status: 5 min tolerance  Goal Met?  No     3. Patient will have 5/5 LE strength to be able to climb stairs without issues to carry groceries into home.   Status at last Eval/Progress Note: 4+/5  Current Status: 4+/5 just in hip flexion; improved in other areas  Goal Met?  In Progress        Long Term Goals, to be met within 10 treatments:  1.Patient will be able to walk for >20 minutes with less then 3/10 pain to perform yard work tasks.   Status at last Eval/Progress Note: pain within 5 minutes and not doing yard work  Current Status: 5 min tolerance  Goal Met?  No     2.  Patient will be able to squat to pick objects off the floor without limitations for house and yard work.  Status at last Eval/Progress Note: difficulty with this task  Current Status: can perform, but still some difficult  Goal Met?  In Progress     3. Patient will be able to get out of bed with less then 3/10 pain and less difficulty with improve lumbar flexion to >80 deg.   Status at last Eval/Progress Note: 70 deg  Current Status: 75 deg and 2/10  Goal Met?  In Progress        Frequency/Duration:  2 treatments per week, for 10 additional treatments.    RECOMMENDATIONS  [x]  Continue plan of care  [x]  Upgrade activities as tolerated    Certification Period:  08/21/2024 to 11/19/2024      Abi Carr PT, DPT       8/21/2024       11:36 AM    ________________________________________________________________________  NOTE TO PHYSICIAN:  Please complete the following and fax to:  [facility]:   Fax: [phone]  Retain this original for your records.  If you are unable to

## 2024-08-26 ENCOUNTER — HOSPITAL ENCOUNTER (OUTPATIENT)
Facility: HOSPITAL | Age: 74
Setting detail: RECURRING SERIES
Discharge: HOME | End: 2024-08-29
Payer: MEDICARE

## 2024-08-26 PROCEDURE — 97110 THERAPEUTIC EXERCISES: CPT

## 2024-08-26 NOTE — PROGRESS NOTES
PHYSICAL THERAPY - MEDICARE DAILY TREATMENT NOTE (updated 3/23)    Date of Service: 2024        Patient Name:  Byron Hyde :  1950   Medical   Diagnosis:  S/P lumbar spinal fusion [Z98.1] Treatment Diagnosis:  M54.59  OTHER LOWER BACK PAIN and M54.59, G89.29  CHRONIC LOWER BACK PAIN    Referral Source:  Jeimy Swartz MD Insurance:   Payor: MEDICARE / Plan: MEDICARE PART A AND B / Product Type: *No Product type* /    [x] Patient's date of birth verified                      Visit #   Current  / Total 10 19   Time   In / Out 903 am 1008 am   Total Treatment Time (in minutes) 65    Total Timed Codes (in minutes) 55    1:1 Treatment Time (in minutes) 55       [facility] Totals Reminder:  bill using total billable   min of TIMED therapeutic procedures and modalities.   8-22 min = 1 unit; 23-37 min = 2 units; 38-52 min = 3 units; 53-67 min = 4 units; 68-82 min = 5 units        SUBJECTIVE  Pain Level (0-10 scale): 5/10 back    Any medication changes, allergies to medications, adverse drug reactions, diagnosis change, or new procedure performed?: No  Medications: [x] Verified on Patient Summary List    Subjective functional status/changes:     \"Pt notes that he does like the new ex given to him by the DPT last session. He still has a leg pain of 6/10 .\"    OBJECTIVE  Therapeutic Procedures:  Tx Min Billable or 1:1 Min (if diff from Tx Min) Procedure, Rationale, Specifics   55 55 [id number] Therapeutic Exercise (timed):  increase ROM, strength, coordination, balance, and proprioception to improve patient's ability to progress to PLOF and address remaining functional goals. (see flow sheet as applicable)   55 55    Total Total   [x] Patient Education billed concurrently with other procedures     Modalities Rationale:     decrease pain, increase tissue extensibility, and increase muscle contraction/control to improve patient's ability to progress to PLOF and address remaining functional goals.       min [] Estim

## 2024-08-28 ENCOUNTER — HOSPITAL ENCOUNTER (OUTPATIENT)
Facility: HOSPITAL | Age: 74
Setting detail: RECURRING SERIES
Discharge: HOME | End: 2024-08-31
Payer: MEDICARE

## 2024-08-28 PROCEDURE — 97110 THERAPEUTIC EXERCISES: CPT

## 2024-08-28 NOTE — PROGRESS NOTES
PHYSICAL THERAPY - MEDICARE DAILY TREATMENT NOTE (updated 3/23)    Date of Service: 2024        Patient Name:  Byron Hyde :  1950   Medical   Diagnosis:  S/P lumbar spinal fusion [Z98.1] Treatment Diagnosis:  M54.59  OTHER LOWER BACK PAIN    Referral Source:  Jeimy Swartz MD Insurance:   Payor: MEDICARE / Plan: MEDICARE PART A AND B / Product Type: *No Product type* /    [x] Patient's date of birth verified                      Visit #   Current  / Total 11 19   Time   In / Out 0903 1005   Total Treatment Time (in minutes) 62    Total Timed Codes (in minutes) 52    1:1 Treatment Time (in minutes) 52       [facility] Totals Reminder:  bill using total billable   min of TIMED therapeutic procedures and modalities.   8-22 min = 1 unit; 23-37 min = 2 units; 38-52 min = 3 units; 53-67 min = 4 units; 68-82 min = 5 units        SUBJECTIVE  Pain Level (0-10 scale): 2/10    Any medication changes, allergies to medications, adverse drug reactions, diagnosis change, or new procedure performed?: No  Medications: [x] Verified on Patient Summary List    Subjective functional status/changes:     \"I don't have any back pain, but I have like a crick in my hips.\"    OBJECTIVE  Therapeutic Procedures:  Tx Min Billable or 1:1 Min (if diff from Tx Min) Procedure, Rationale, Specifics   52  46512 Therapeutic Exercise (timed):  increase ROM, strength, coordination, balance, and proprioception to improve patient's ability to progress to PLOF and address remaining functional goals. (see flow sheet as applicable)   52     Total Total   [x] Patient Education billed concurrently with other procedures     Modalities Rationale:     decrease pain and increase tissue extensibility to improve patient's ability to progress to PLOF and address remaining functional goals.       min [] Estim Unattended,             []  NMES  [] PreMod       []  IFC  [] Other:  []w/US   []w/ice   []w/heat  Position:  Location:            min []   Mechanical Traction,        []  Cervical      []  Lumbar        []  Prone         []  Supine           []  Intermitt.     []  Cont.     Lbs:    pounds  [] With heat  [] Simultaneously performed with E-Stim    min []  Ultrasound,    []Continuous   [] Pulsed  []1MHz   []3MHz Location:  W/cm2:   10 min  Unbilled []  Ice     [x]  Heat             Position: seated  Location: low back            min []  Vasopneumatic Device,      pre-treatment girth :  cm   post-treatment girth : cm   measured at (landmark       location) :  Pressure:       [] lo [] med [] hi   Temperature: [] lo [] med [] Hi    [] With ice    [] Simultaneously performed with Estim     Skin assessment post-treatment (if applicable):    [x]  intact    [x]  redness- no adverse reaction []redness - adverse reaction:        Pain Level at end of session (0-10 scale): 0/10    Assessment   Patient tolerated treatment fair. Progressed knee fall-outs to with theraband today. Also progressed sidestepping without UE, as tolerated, and added ankle weights - pt did seem to fatigue towards end of sidestepping, but was able to complete. Patient will continue to benefit from skilled PT services to modify and progress therapeutic interventions, analyze and address functional mobility deficits, analyze and address ROM deficits, and analyze and address strength deficits to address functional deficits and attain remaining goals.    PRECAUTIONS:  PACEMAKER- no estim        Date of Initial Evaluation: 7/24/2024  Date of last Progress Note: 08/21/2024  Visit # of last Progress Note: 9     Number of Insurance Authorized visits: Not Applicable         Therapeutic Exercise Flow Sheet:                                                                                    Running Visit #    EXERCISE   11   12   13   14   15   16   17   18   19   20      Nustep   Lvl 3 x12'               LTR Supine  x20             Seated Marches unsupported   x20               Bent Knee Fallouts

## 2024-09-03 ENCOUNTER — HOSPITAL ENCOUNTER (OUTPATIENT)
Facility: HOSPITAL | Age: 74
Setting detail: RECURRING SERIES
Discharge: HOME | End: 2024-09-06
Payer: MEDICARE

## 2024-09-03 PROCEDURE — 97150 GROUP THERAPEUTIC PROCEDURES: CPT

## 2024-09-03 NOTE — PROGRESS NOTES
PHYSICAL THERAPY - MEDICARE DAILY TREATMENT NOTE (updated 3/23)    Date of Service: 9/3/2024        Patient Name:  Byron Hyde :  1950   Medical   Diagnosis:  S/P lumbar spinal fusion [Z98.1] Treatment Diagnosis:  M54.59  OTHER LOWER BACK PAIN    Referral Source:  Jeimy Swartz MD Insurance:   Payor: MEDICARE / Plan: MEDICARE PART A AND B / Product Type: *No Product type* /    [x] Patient's date of birth verified                      Visit #   Current  / Total 12 19   Time   In / Out 1000 1100   Total Treatment Time (in minutes) 60    Total Timed Codes (in minutes) 5    1:1 Treatment Time (in minutes) 5       [facility] Totals Reminder:  bill using total billable   min of TIMED therapeutic procedures and modalities.   8-22 min = 1 unit; 23-37 min = 2 units; 38-52 min = 3 units; 53-67 min = 4 units; 68-82 min = 5 units        SUBJECTIVE  Pain Level (0-10 scale): 0/10    Any medication changes, allergies to medications, adverse drug reactions, diagnosis change, or new procedure performed?: No  Medications: [x] Verified on Patient Summary List    Subjective functional status/changes:     \"I have not hurt since the last time I was here.\"    OBJECTIVE  Therapeutic Procedures:  Tx Min Billable or 1:1 Min (if diff from Tx Min) Procedure, Rationale, Specifics   5 0 91077 Therapeutic Exercise (timed):  increase ROM, strength, coordination, balance, and proprioception to improve patient's ability to progress to PLOF and address remaining functional goals. (see flow sheet as applicable)   45 0 83150 Group Therapy (untimed): Billed while completing therapeutic exercise during beginning of treatment session to improve patient's ability to progress to PLOF and address remaining functional goals.  (see flow sheet as applicable)   50 0    Total Total   [x] Patient Education billed concurrently with other procedures     Modalities Rationale:     decrease pain and increase tissue extensibility to improve patient's

## 2024-09-05 ENCOUNTER — HOSPITAL ENCOUNTER (OUTPATIENT)
Facility: HOSPITAL | Age: 74
Setting detail: RECURRING SERIES
Discharge: HOME | End: 2024-09-08
Payer: MEDICARE

## 2024-09-05 PROCEDURE — 97110 THERAPEUTIC EXERCISES: CPT

## 2024-09-09 ENCOUNTER — HOSPITAL ENCOUNTER (OUTPATIENT)
Facility: HOSPITAL | Age: 74
Setting detail: RECURRING SERIES
Discharge: HOME | End: 2024-09-12
Payer: MEDICARE

## 2024-09-09 PROCEDURE — 97110 THERAPEUTIC EXERCISES: CPT

## 2024-09-12 ENCOUNTER — HOSPITAL ENCOUNTER (OUTPATIENT)
Facility: HOSPITAL | Age: 74
Setting detail: RECURRING SERIES
Discharge: HOME | End: 2024-09-15
Payer: MEDICARE

## 2024-09-12 PROCEDURE — 97110 THERAPEUTIC EXERCISES: CPT

## 2024-09-12 PROCEDURE — 97150 GROUP THERAPEUTIC PROCEDURES: CPT

## 2024-09-17 ENCOUNTER — HOSPITAL ENCOUNTER (OUTPATIENT)
Facility: HOSPITAL | Age: 74
Setting detail: RECURRING SERIES
Discharge: HOME | End: 2024-09-20
Payer: MEDICARE

## 2024-09-17 PROCEDURE — 97110 THERAPEUTIC EXERCISES: CPT

## 2024-09-19 ENCOUNTER — HOSPITAL ENCOUNTER (OUTPATIENT)
Facility: HOSPITAL | Age: 74
Setting detail: RECURRING SERIES
Discharge: HOME | End: 2024-09-22
Payer: MEDICARE

## 2024-09-19 PROCEDURE — 97150 GROUP THERAPEUTIC PROCEDURES: CPT

## 2024-09-19 PROCEDURE — 97110 THERAPEUTIC EXERCISES: CPT

## 2024-09-23 ENCOUNTER — HOSPITAL ENCOUNTER (OUTPATIENT)
Facility: HOSPITAL | Age: 74
Setting detail: RECURRING SERIES
Discharge: HOME | End: 2024-09-26
Payer: MEDICARE

## 2024-09-23 PROCEDURE — 97110 THERAPEUTIC EXERCISES: CPT

## 2024-09-24 RX ORDER — CHOLECALCIFEROL (VITAMIN D3) 1250 MCG
1 CAPSULE ORAL
COMMUNITY

## 2024-09-24 RX ORDER — ATORVASTATIN CALCIUM 20 MG/1
20 TABLET, FILM COATED ORAL DAILY
COMMUNITY

## 2024-09-24 RX ORDER — ASPIRIN 81 MG/1
81 TABLET, CHEWABLE ORAL DAILY
COMMUNITY

## 2024-09-24 RX ORDER — TACROLIMUS 1 MG/1
1 CAPSULE ORAL DAILY
COMMUNITY

## 2024-09-24 RX ORDER — LANOLIN ALCOHOL/MO/W.PET/CERES
400 CREAM (GRAM) TOPICAL DAILY
COMMUNITY

## 2024-09-24 RX ORDER — TAMSULOSIN HYDROCHLORIDE 0.4 MG/1
0.4 CAPSULE ORAL DAILY
COMMUNITY

## 2024-09-24 RX ORDER — LISINOPRIL 20 MG/1
20 TABLET ORAL DAILY
COMMUNITY

## 2024-09-24 NOTE — PROGRESS NOTES
PA completed with patient and wife. Patient is a local procedure, per Dr. Kulkarni, patient does not have to be NPO. Patient states understanding, patient has insulin pump with continuous dose. Encouraged patient to have breakfast prior to procedure, and if patient does not eat to follow specific instructions from his physician who monitors insulin pump. Per patient he is on his way to an appointment with insulin pump physician.

## 2024-09-25 ENCOUNTER — HOSPITAL ENCOUNTER (OUTPATIENT)
Facility: HOSPITAL | Age: 74
Setting detail: RECURRING SERIES
Discharge: HOME | End: 2024-09-28
Payer: MEDICARE

## 2024-09-25 PROCEDURE — 97110 THERAPEUTIC EXERCISES: CPT

## 2024-09-27 ENCOUNTER — HOSPITAL ENCOUNTER (OUTPATIENT)
Facility: HOSPITAL | Age: 74
Discharge: HOME | End: 2024-09-27
Attending: ORTHOPAEDIC SURGERY | Admitting: ORTHOPAEDIC SURGERY
Payer: MEDICARE

## 2024-09-27 VITALS
SYSTOLIC BLOOD PRESSURE: 161 MMHG | DIASTOLIC BLOOD PRESSURE: 92 MMHG | OXYGEN SATURATION: 99 % | RESPIRATION RATE: 18 BRPM | TEMPERATURE: 98.1 F | BODY MASS INDEX: 36.3 KG/M2 | HEART RATE: 69 BPM | HEIGHT: 72 IN | WEIGHT: 268 LBS

## 2024-09-27 PROBLEM — M65.90 TENOSYNOVITIS: Status: ACTIVE | Noted: 2024-09-27

## 2024-09-27 PROBLEM — M65.9 TENOSYNOVITIS: Status: ACTIVE | Noted: 2024-09-27

## 2024-09-27 LAB
GLUCOSE BLD STRIP.AUTO-MCNC: 152 MG/DL (ref 65–100)
PERFORMED BY:: ABNORMAL

## 2024-09-27 PROCEDURE — 6370000000 HC RX 637 (ALT 250 FOR IP): Performed by: ORTHOPAEDIC SURGERY

## 2024-09-27 PROCEDURE — 2709999900 HC NON-CHARGEABLE SUPPLY: Performed by: ORTHOPAEDIC SURGERY

## 2024-09-27 PROCEDURE — 2500000003 HC RX 250 WO HCPCS: Performed by: ORTHOPAEDIC SURGERY

## 2024-09-27 PROCEDURE — 82962 GLUCOSE BLOOD TEST: CPT

## 2024-09-27 PROCEDURE — 3600000002 HC SURGERY LEVEL 2 BASE: Performed by: ORTHOPAEDIC SURGERY

## 2024-09-27 PROCEDURE — 7100000010 HC PHASE II RECOVERY - FIRST 15 MIN: Performed by: ORTHOPAEDIC SURGERY

## 2024-09-27 PROCEDURE — 3600000012 HC SURGERY LEVEL 2 ADDTL 15MIN: Performed by: ORTHOPAEDIC SURGERY

## 2024-09-27 PROCEDURE — 6360000002 HC RX W HCPCS: Performed by: ORTHOPAEDIC SURGERY

## 2024-09-27 PROCEDURE — 7100000011 HC PHASE II RECOVERY - ADDTL 15 MIN: Performed by: ORTHOPAEDIC SURGERY

## 2024-09-27 RX ORDER — LIDOCAINE HYDROCHLORIDE 10 MG/ML
INJECTION, SOLUTION INFILTRATION; PERINEURAL PRN
Status: DISCONTINUED | OUTPATIENT
Start: 2024-09-27 | End: 2024-09-27 | Stop reason: ALTCHOICE

## 2024-09-27 RX ORDER — CEPHALEXIN 500 MG/1
1000 CAPSULE ORAL ONCE
Status: COMPLETED | OUTPATIENT
Start: 2024-09-27 | End: 2024-09-27

## 2024-09-27 RX ORDER — BUPIVACAINE HYDROCHLORIDE 2.5 MG/ML
INJECTION, SOLUTION EPIDURAL; INFILTRATION; INTRACAUDAL PRN
Status: DISCONTINUED | OUTPATIENT
Start: 2024-09-27 | End: 2024-09-27 | Stop reason: ALTCHOICE

## 2024-09-27 RX ORDER — ONDANSETRON 2 MG/ML
4 INJECTION INTRAMUSCULAR; INTRAVENOUS EVERY 6 HOURS PRN
Status: DISCONTINUED | OUTPATIENT
Start: 2024-09-27 | End: 2024-09-27 | Stop reason: HOSPADM

## 2024-09-27 RX ORDER — ONDANSETRON 4 MG/1
4 TABLET, ORALLY DISINTEGRATING ORAL EVERY 8 HOURS PRN
Status: DISCONTINUED | OUTPATIENT
Start: 2024-09-27 | End: 2024-09-27 | Stop reason: HOSPADM

## 2024-09-27 RX ADMIN — CEPHALEXIN 1000 MG: 500 CAPSULE ORAL at 06:41

## 2024-09-27 ASSESSMENT — PAIN DESCRIPTION - DESCRIPTORS: DESCRIPTORS: ACHING

## 2024-09-27 ASSESSMENT — PAIN - FUNCTIONAL ASSESSMENT
PAIN_FUNCTIONAL_ASSESSMENT: 0-10
PAIN_FUNCTIONAL_ASSESSMENT: 0-10

## 2024-09-27 ASSESSMENT — PULMONARY FUNCTION TESTS: PIF_VALUE: 0

## 2024-09-27 NOTE — OP NOTE
Operative Note      Patient: Byron Hyde  YOB: 1950  MRN: [id number]    Date of Procedure: 9/27/2024    Pre-Op Diagnosis Codes:      * Tenosynovitis, de Quervain [M65.4]    Post-Op Diagnosis:  De Quervain's tenosynovitis right wrist       Procedure(s):  DEQUERVAINS RELEASE RIGHT WRIST    Surgeon(s):  Nell Kulkarni MD    Assistant:   Physician Assistant: Dory Jean PA-C    Anesthesia: Local    Estimated Blood Loss (mL): Minimal    Complications: None    Specimens:   * No specimens in log *    Implants:  * No implants in log *      Drains: * No LDAs found *    Findings:  Infection Present At Time Of Surgery (PATOS) (choose all levels that have infection present):  No infection present  Other Findings: Tight sheath     Detailed Description of Procedure:   Patient was prepped and draped in usual manner, the field block was given over the distal radius, incision was made about 1.5 cm proximal to the radial styloid, skin subtenons tissue cut, the superficial radial nerve was identified and protected, incision was made over the tendon sheath there is fairly thickened area, this was then released all the way distally mainly on the dorsal side no subluxation was noted complete release locked and there was no accessory tendon noted, wound was then thoroughly irrigated closed in layers and a splint applied patient Toller surgery well and left the operating room in stable condition, splint is for a week to 10 days followed by range of motion exercises  Electronically signed by Nell Kulkarni MD on 9/27/2024 at 2:36 PM

## 2024-10-16 ENCOUNTER — HOSPITAL ENCOUNTER (OUTPATIENT)
Facility: HOSPITAL | Age: 74
Setting detail: RECURRING SERIES
Discharge: HOME | End: 2024-10-19
Payer: MEDICARE

## 2024-10-16 PROCEDURE — 97110 THERAPEUTIC EXERCISES: CPT

## 2024-10-16 PROCEDURE — 97150 GROUP THERAPEUTIC PROCEDURES: CPT

## 2024-10-16 NOTE — PROGRESS NOTES
PHYSICAL THERAPY - MEDICARE DAILY TREATMENT NOTE (updated 3/23)    Date of Service: 10/16/2024        Patient Name:  Byron Hyde :  1950   Medical   Diagnosis:  S/P lumbar spinal fusion [Z98.1] Treatment Diagnosis:  M54.59  OTHER LOWER BACK PAIN    Referral Source:  Jeimy Swartz MD Insurance:   Payor: MEDICARE / Plan: MEDICARE PART A AND B / Product Type: *No Product type* /    [x] Patient's date of birth verified                      Visit #   Current  / Total 20 27   Time   In / Out 1332  1432   Total Treatment Time (in minutes) 60    Total Timed Codes (in minutes) 30    1:1 Treatment Time (in minutes) 30       [facility] Totals Reminder:  bill using total billable   min of TIMED therapeutic procedures and modalities.   8-22 min = 1 unit; 23-37 min = 2 units; 38-52 min = 3 units; 53-67 min = 4 units; 68-82 min = 5 units        SUBJECTIVE  Pain Level (0-10 scale): 8/10    Any medication changes, allergies to medications, adverse drug reactions, diagnosis change, or new procedure performed?: No  Medications: [x] Verified on Patient Summary List    Subjective functional status/changes:     \"Since I had to stop to have wrist surgery, my back has gotten worse.\"    OBJECTIVE  Therapeutic Procedures:  Tx Min Billable or 1:1 Min (if diff from Tx Min) Procedure, Rationale, Specifics   30 30 24739 Therapeutic Exercise (timed):  increase ROM, strength, coordination, balance, and proprioception to improve patient's ability to progress to PLOF and address remaining functional goals. (see flow sheet as applicable)   20 0 71616 Group Therapy (untimed): Billed while completing therapeutic exercise during beginning of treatment session to improve patient's ability to progress to PLOF and address remaining functional goals.  (see flow sheet as applicable)   50 30    Total Total   [x] Patient Education billed concurrently with other procedures     Modalities Rationale:     decrease pain, increase tissue 
from therapy    Physician's Signature:______________________________________ Date:___________Time:__________                              Jeimy Swartz MD    ** Signature, Date and Time must be completed for valid certification **  Please sign and fax to [phone].  Thank you    Patient Name:  Byron CHATTERJEE Mary Ellen :  1950

## 2024-10-21 ENCOUNTER — APPOINTMENT (OUTPATIENT)
Facility: HOSPITAL | Age: 74
End: 2024-10-21
Payer: MEDICARE

## 2024-10-30 ENCOUNTER — HOSPITAL ENCOUNTER (OUTPATIENT)
Facility: HOSPITAL | Age: 74
Setting detail: RECURRING SERIES
Discharge: HOME | End: 2024-11-02
Payer: MEDICARE

## 2024-10-30 PROCEDURE — 97150 GROUP THERAPEUTIC PROCEDURES: CPT

## 2024-10-30 PROCEDURE — 97110 THERAPEUTIC EXERCISES: CPT

## 2024-10-30 NOTE — PROGRESS NOTES
PHYSICAL THERAPY - MEDICARE DAILY TREATMENT NOTE (updated 3/23)    Date of Service: 10/30/2024        Patient Name:  Byron Hyde :  1950   Medical   Diagnosis:  S/P lumbar spinal fusion [Z98.1] Treatment Diagnosis:  M54.59  OTHER LOWER BACK PAIN    Referral Source:  Jeimy Swartz MD Insurance:   Payor: MEDICARE / Plan: MEDICARE PART A AND B / Product Type: *No Product type* /    [x] Patient's date of birth verified                      Visit #   Current  / Total 21 27   Time   In / Out 1400 1456   Total Treatment Time (in minutes) 56    Total Timed Codes (in minutes) 31    1:1 Treatment Time (in minutes) 31      [facility] Totals Reminder:  bill using total billable   min of TIMED therapeutic procedures and modalities.   8-22 min = 1 unit; 23-37 min = 2 units; 38-52 min = 3 units; 53-67 min = 4 units; 68-82 min = 5 units        SUBJECTIVE  Pain Level (0-10 scale): 10/10    Any medication changes, allergies to medications, adverse drug reactions, diagnosis change, or new procedure performed?: No  Medications: [x] Verified on Patient Summary List    Subjective functional status/changes:     Pt states he fell over a box backwards the other day and is now complaining of his tailbone and ankle hurting.    OBJECTIVE  Therapeutic Procedures:  Tx Min Billable or 1:1 Min (if diff from Tx Min) Procedure, Rationale, Specifics   31 31 [id number] Therapeutic Exercise (timed):  increase ROM, strength, coordination, balance, and proprioception to improve patient's ability to progress to PLOF and address remaining functional goals. (see flow sheet as applicable)   15 0 05667 Group Therapy (untimed): Billed while completing therapeutic exercise during end of treatment session to improve patient's ability to progress to PLOF and address remaining functional goals.  (see flow sheet as applicable)   46 31    Total Total   [x] Patient Education billed concurrently with other procedures     Modalities Rationale:     decrease

## 2024-11-04 ENCOUNTER — HOSPITAL ENCOUNTER (OUTPATIENT)
Facility: HOSPITAL | Age: 74
Setting detail: RECURRING SERIES
Discharge: HOME | End: 2024-11-07
Payer: MEDICARE

## 2024-11-04 PROCEDURE — 97150 GROUP THERAPEUTIC PROCEDURES: CPT

## 2024-11-04 PROCEDURE — 97110 THERAPEUTIC EXERCISES: CPT

## 2024-11-04 NOTE — PROGRESS NOTES
PHYSICAL THERAPY - MEDICARE DAILY TREATMENT NOTE (updated 3/23)    Date of Service: 2024        Patient Name:  Byron Hyde :  1950   Medical   Diagnosis:  S/P lumbar spinal fusion [Z98.1] Treatment Diagnosis:  M54.59  OTHER LOWER BACK PAIN    Referral Source:  Jeimy Swartz MD Insurance:   Payor: MEDICARE / Plan: MEDICARE PART A AND B / Product Type: *No Product type* /    [x] Patient's date of birth verified                      Visit #   Current  / Total 22 27   Time   In / Out 0935 1030   Total Treatment Time (in minutes) 56    Total Timed Codes (in minutes) 18    1:1 Treatment Time (in minutes) 18      [facility] Totals Reminder:  bill using total billable   min of TIMED therapeutic procedures and modalities.   8-22 min = 1 unit; 23-37 min = 2 units; 38-52 min = 3 units; 53-67 min = 4 units; 68-82 min = 5 units        SUBJECTIVE  Pain Level (0-10 scale): 10/10 sacrum and 6/10 in back    Any medication changes, allergies to medications, adverse drug reactions, diagnosis change, or new procedure performed?: No  Medications: [x] Verified on Patient Summary List    Subjective functional status/changes:     \" I am doing a little better. I am waiting on MD to call me back about what to do about my sacral pain.\"    OBJECTIVE  Therapeutic Procedures:  Tx Min Billable or 1:1 Min (if diff from Tx Min) Procedure, Rationale, Specifics   18 18 43352 Therapeutic Exercise (timed):  increase ROM, strength, coordination, balance, and proprioception to improve patient's ability to progress to PLOF and address remaining functional goals. (see flow sheet as applicable)   27 0 08016 Group Therapy (untimed): Billed while completing therapeutic exercise during end of treatment session to improve patient's ability to progress to PLOF and address remaining functional goals.  (see flow sheet as applicable)   45 18    Total Total   [x] Patient Education billed concurrently with other procedures     Modalities

## 2024-11-07 ENCOUNTER — HOSPITAL ENCOUNTER (OUTPATIENT)
Facility: HOSPITAL | Age: 74
Setting detail: RECURRING SERIES
Discharge: HOME | End: 2024-11-10
Payer: MEDICARE

## 2024-11-07 PROCEDURE — 97150 GROUP THERAPEUTIC PROCEDURES: CPT

## 2024-11-07 PROCEDURE — 97110 THERAPEUTIC EXERCISES: CPT

## 2024-11-07 NOTE — PROGRESS NOTES
PHYSICAL THERAPY - MEDICARE DAILY TREATMENT NOTE (updated 3/23)    Date of Service: 2024        Patient Name:  Byron Hyde :  1950   Medical   Diagnosis:  S/P lumbar spinal fusion [Z98.1] Treatment Diagnosis:  M54.59  OTHER LOWER BACK PAIN and M54.59, G89.29  CHRONIC LOWER BACK PAIN    Referral Source:  Jeimy Swartz MD Insurance:   Payor: MEDICARE / Plan: MEDICARE PART A AND B / Product Type: *No Product type* /    [x] Patient's date of birth verified                      Visit #   Current  / Total 23 27   Time   In / Out 930 am 1032 am   Total Treatment Time (in minutes) 62    Total Timed Codes (in minutes) 52    1:1 Treatment Time (in minutes) 42       [facility] Totals Reminder:  bill using total billable   min of TIMED therapeutic procedures and modalities.   8-22 min = 1 unit; 23-37 min = 2 units; 38-52 min = 3 units; 53-67 min = 4 units; 68-82 min = 5 units        SUBJECTIVE  Pain Level (0-10 scale): 5/10    Any medication changes, allergies to medications, adverse drug reactions, diagnosis change, or new procedure performed?: No  Medications: [x] Verified on Patient Summary List    Subjective functional status/changes:     \"Pt reports back is stiff and sore this morning.\"    OBJECTIVE  Therapeutic Procedures:  Tx Min Billable or 1:1 Min (if diff from Tx Min) Procedure, Rationale, Specifics   42 42 43550 Therapeutic Exercise (timed):  increase ROM, strength, coordination, balance, and proprioception to improve patient's ability to progress to PLOF and address remaining functional goals. (see flow sheet as applicable)   10 0 38590 Group Therapy (untimed): Billed while completing therapeutic exercise during beginning of treatment session to improve patient's ability to progress to PLOF and address remaining functional goals.  (see flow sheet as applicable)   52 42    Total Total   [x] Patient Education billed concurrently with other procedures     Modalities Rationale:     decrease pain,

## 2024-11-11 ENCOUNTER — HOSPITAL ENCOUNTER (OUTPATIENT)
Facility: HOSPITAL | Age: 74
Setting detail: RECURRING SERIES
Discharge: HOME | End: 2024-11-14
Payer: MEDICARE

## 2024-11-11 PROCEDURE — 97110 THERAPEUTIC EXERCISES: CPT

## 2024-11-11 PROCEDURE — 97150 GROUP THERAPEUTIC PROCEDURES: CPT

## 2024-11-11 NOTE — PROGRESS NOTES
PHYSICAL THERAPY - MEDICARE DAILY TREATMENT NOTE (updated 3/23)    Date of Service: 2024        Patient Name:  Bryon Hyde :  1950   Medical   Diagnosis:  S/P lumbar spinal fusion [Z98.1] Treatment Diagnosis:  M54.59  OTHER LOWER BACK PAIN and M54.59, G89.29  CHRONIC LOWER BACK PAIN    Referral Source:  Jeimy Swartz MD Insurance:   Payor: MEDICARE / Plan: MEDICARE PART A AND B / Product Type: *No Product type* /    [x] Patient's date of birth verified                      Visit #   Current  / Total 24 30   Time   In / Out 0800 0904   Total Treatment Time (in minutes) 64    Total Timed Codes (in minutes) 27    1:1 Treatment Time (in minutes) 42       [facility] Totals Reminder:  bill using total billable   min of TIMED therapeutic procedures and modalities.   8-22 min = 1 unit; 23-37 min = 2 units; 38-52 min = 3 units; 53-67 min = 4 units; 68-82 min = 5 units        SUBJECTIVE  Pain Level (0-10 scale): 2/10    Any medication changes, allergies to medications, adverse drug reactions, diagnosis change, or new procedure performed?: No  Medications: [x] Verified on Patient Summary List    Subjective functional status/changes:     \"I am doing better.\"    OBJECTIVE  Therapeutic Procedures:  Tx Min Billable or 1:1 Min (if diff from Tx Min) Procedure, Rationale, Specifics   27 27 71051 Therapeutic Exercise (timed):  increase ROM, strength, coordination, balance, and proprioception to improve patient's ability to progress to PLOF and address remaining functional goals. (see flow sheet as applicable)   22 0 69286 Group Therapy (untimed): Billed while completing therapeutic exercise during beginning of treatment session to improve patient's ability to progress to PLOF and address remaining functional goals.  (see flow sheet as applicable)   49 27    Total Total   [x] Patient Education billed concurrently with other procedures     Modalities Rationale:     decrease pain, increase tissue extensibility, and

## 2024-11-14 ENCOUNTER — HOSPITAL ENCOUNTER (OUTPATIENT)
Facility: HOSPITAL | Age: 74
Setting detail: RECURRING SERIES
Discharge: HOME | End: 2024-11-17
Payer: MEDICARE

## 2024-11-14 PROCEDURE — 97110 THERAPEUTIC EXERCISES: CPT

## 2024-11-14 NOTE — PROGRESS NOTES
[facility]  [address] [place] [id number]  Ph: [phone]     Fax: [phone]    CONTINUED PLAN OF CARE/RECERTIFICATION FOR PHYSICAL THERAPY          Patient Name:              Byron Hyde :  1950   Treatment/Medical Diagnosis:  S/P lumbar spinal fusion [Z98.1]   Onset Date:  2024    Referral Source:  Jeimy Swartz MD Start of Care (SOC):  2024   Prior Hospitalization:  See Medical History Provider #:  NPI      Prior Level of Function (PLOF):  independent   Comorbidities:  See Medical History   Medications:  Verified on Patient Summary List   Visits from SOC:  25 Missed Visits:  0       Summary of Care / Assessment / Recommendations:   Patient has attended 25 visits addressing core weakness and lumbar ROM post lumbar surgery. Patient hd a set back over the past month secondary to a fall where he hurt his sacrum which limited some of the exercises we could perform in the clinic. He was able to perform more standing stuff without pain today, but does not have standing endurance to perform household chores. He was given encouragement to stand or walk for at least 5 minutes every hour to build the last piece of limitations throughout his day. Patient will continue to benefit from skilled PT services x2/week to modify and progress therapeutic interventions, analyze and address functional mobility deficits, analyze and address ROM deficits, analyze and address strength deficits, and analyze and address soft tissue restrictions to address functional deficits and attain remaining goals.       Progress Toward Goals:   Short Term Goals, to be met within 5 treatments:  Patient will demonstrate independence and compliance with HEP in order to increase mobility and assist with carryover from PT services.  Status at last Eval/Progress Note: performing the ones he can  Current Status: can perform  Goal Met?  Yes     2.  Patient will be able to stand

## 2024-11-14 NOTE — PROGRESS NOTES
PHYSICAL THERAPY - MEDICARE DAILY TREATMENT NOTE (updated 3/23)    Date of Service: 2024        Patient Name:  Byron Hyde :  1950   Medical   Diagnosis:  S/P lumbar spinal fusion [Z98.1] Treatment Diagnosis:  M54.59  OTHER LOWER BACK PAIN and M54.59, G89.29  CHRONIC LOWER BACK PAIN    Referral Source:  Jeimy Swartz MD Insurance:   Payor: MEDICARE / Plan: MEDICARE PART A AND B / Product Type: *No Product type* /    [x] Patient's date of birth verified                      Visit #   Current  / Total 25 30   Time   In / Out 0900 1006   Total Treatment Time (in minutes) 66    Total Timed Codes (in minutes) 56    1:1 Treatment Time (in minutes) 56       [facility] Totals Reminder:  bill using total billable   min of TIMED therapeutic procedures and modalities.   8-22 min = 1 unit; 23-37 min = 2 units; 38-52 min = 3 units; 53-67 min = 4 units; 68-82 min = 5 units        SUBJECTIVE  Pain Level (0-10 scale): 2/10    Any medication changes, allergies to medications, adverse drug reactions, diagnosis change, or new procedure performed?: No  Medications: [x] Verified on Patient Summary List    Subjective functional status/changes:     \"I am feeling better and getting back to were I was.\"    OBJECTIVE  Therapeutic Procedures:  Tx Min Billable or 1:1 Min (if diff from Tx Min) Procedure, Rationale, Specifics   56  00743 Therapeutic Exercise (timed):  increase ROM, strength, coordination, balance, and proprioception to improve patient's ability to progress to PLOF and address remaining functional goals. (see flow sheet as applicable)   56     Total Total   [x] Patient Education billed concurrently with other procedures     Modalities Rationale:     decrease pain, increase tissue extensibility, and increase muscle contraction/control to improve patient's ability to progress to PLOF and address remaining functional goals.       min [] Estim Unattended,             []  NMES  [] PreMod       []  IFC  [] Other:

## 2024-11-19 ENCOUNTER — HOSPITAL ENCOUNTER (OUTPATIENT)
Facility: HOSPITAL | Age: 74
Setting detail: RECURRING SERIES
Discharge: HOME | End: 2024-11-22
Payer: MEDICARE

## 2024-11-19 PROCEDURE — 97110 THERAPEUTIC EXERCISES: CPT

## 2024-11-19 NOTE — PROGRESS NOTES
Lvl 3 hill x 12'    lvl 3 x12 min   lvl 3 x10 min lvl 3 x12 min               LTR Supine  x20 x20  Supine  x20  seated x20               LAQ    x20 x20 X 20   2# x20                 Seated Marches unsupported Supine  x20 Supine  X20 and seated x20  Supine  X20 2# x20 B                   Bent Knee Fallouts Red  x20 Red  x20 RTB 10\"/10                     Bridges    P!    x20  with ball and guarded x 20                     HS stretch Seated  EOM  30\"X2 B Seated  EOM  30\"X2 B Seated with strap 3/30\"   Seated with strap 3/30\"                   Sit to Stand      foam x 10 with ext  foam 2 x 10 with ext  EOM [id number] ext              Stand Alt   3 Way Hip    + marches       2# x20 ea + HS curl   2# x20 ea + HS curl    2# x20 ea + HS curl               Seated Trunk Flexion  EOM   no ball  30\"x3 EOM 3x30 sec                      Side Stepping        2' -2#  3 min 2 #  3 min 2 #    3 min no UE           Tandem walking      2' -2#  Fwd/bwd     3 min 1 UE               Walking for Time          4 min 46 sec 4 laps 4 min 27 sec 4 laps            Piriformis stretch (push) supine  20\"x3    supine  20\"x3                    TB Ex- retrac/ext   Retrac only c20 RTB   X15 ea RTB  X20 ea RTB    X20 ea RTB           Modalities to be included future treatment sessions: Heat Pack  Has HEP been provided to patient? [] No    [x] Yes     Access Code:  [email or web address]    Date Provided: 07/24/2024  [] Reviewed HEP    [x] Progressed/Changed HEP, details: [id number]  updated 08/21/2024      GOALS  Short Term Goals, to be met within 5 treatments:  Patient will demonstrate independence and compliance with HEP in order to increase mobility and assist with carryover from PT services.  Status at last Eval/Progress Note: performing the ones he can  Current Status: can perform  Goal Met?  Yes     2.  Patient will be able to stand for >15 minutes without fatigue and pain >4/10 in lumbar spine.   Status at last Eval/Progress Note: 10 min tolerance  Current

## 2024-11-21 ENCOUNTER — APPOINTMENT (OUTPATIENT)
Facility: HOSPITAL | Age: 74
End: 2024-11-21
Payer: MEDICARE

## (undated) DEVICE — GLOVE ORTHO 8   MSG9480

## (undated) DEVICE — BANDAGE COMPR W4INXL5YD WHT BGE POLY COT M E WRP WV HK AND

## (undated) DEVICE — SPONGE GZ W4XL4IN COT 12 PLY TYP VII WVN C FLD DSGN STERILE

## (undated) DEVICE — SUTURE MONOCRYL + SZ 3-0 L27IN ABSRB UD PS1 L24MM 3/8 CIR REV MCP936H

## (undated) DEVICE — GLOVE SURG SZ 8 CRM LTX FREE POLYISOPRENE POLYMER BEAD ANTI

## (undated) DEVICE — HYPODERMIC SAFETY NEEDLE: Brand: MONOJECT

## (undated) DEVICE — STRIP SKIN CLSR 1/4INX1.5IN REINF WND NYL CURAD

## (undated) DEVICE — SUTURE N ABSRB L 18 IN SZ 4-0 NDL L 19 MM NYL MONOFILAMENT

## (undated) DEVICE — 1010 S-DRAPE TOWEL DRAPE 10/BX: Brand: STERI-DRAPE™

## (undated) DEVICE — PADDING UNDERCAST W4INXL12FT RAYON POLY SYN NONADHESIVE

## (undated) DEVICE — GLOVE SURG SZ 8 L12IN FNGR THK79MIL GRN LTX FREE

## (undated) DEVICE — UPPER EXTREMITY: Brand: MEDLINE INDUSTRIES, INC.

## (undated) DEVICE — SUTURE MONOCRYL + ABSORBABLE MONOFILAMENT 2-0 SH 27 IN VIO  MCP317H

## (undated) DEVICE — 60-7070-103 TRNQT,DPSB,PLC RED: Brand: MEDLINE RENEWAL

## (undated) DEVICE — GARMENT,MEDLINE,DVT,INT,CALF,MED, GEN2: Brand: MEDLINE

## (undated) DEVICE — DRAPE,HAND,STERILE: Brand: MEDLINE

## (undated) DEVICE — Device

## (undated) DEVICE — DRAPE,REIN 53X77,STERILE: Brand: MEDLINE

## (undated) DEVICE — BLADE,CARBON-STEEL,15,STRL,DISPOSABLE,TB: Brand: MEDLINE

## (undated) DEVICE — BASIC SINGLE BASIN-LF: Brand: MEDLINE INDUSTRIES, INC.

## (undated) DEVICE — CORD BPLR 12FT SGL USE CLR

## (undated) DEVICE — BANDAGE,GAUZE,CONFORMING,4"X75",STRL,LF: Brand: MEDLINE

## (undated) DEVICE — ZIMMER® STERILE DISPOSABLE TOURNIQUET CUFF WITH PLC, DUAL PORT, SINGLE BLADDER, 18 IN. (46 CM)

## (undated) DEVICE — SUTURE VICRYL + SZ 3-0 L27IN ABSRB UD L26MM SH 1/2 CIR VCP416H

## (undated) DEVICE — SYRINGE MED 10ML LUERLOCK TIP W/O SFTY DISP

## (undated) DEVICE — PADDING CAST W4INXL4YD ST COT RAYON MICROPLEATED HIGHLY